# Patient Record
Sex: FEMALE | Race: WHITE | NOT HISPANIC OR LATINO | Employment: FULL TIME | URBAN - METROPOLITAN AREA
[De-identification: names, ages, dates, MRNs, and addresses within clinical notes are randomized per-mention and may not be internally consistent; named-entity substitution may affect disease eponyms.]

---

## 2017-02-03 ENCOUNTER — APPOINTMENT (OUTPATIENT)
Dept: LAB | Facility: CLINIC | Age: 22
End: 2017-02-03
Payer: COMMERCIAL

## 2017-02-03 ENCOUNTER — TRANSCRIBE ORDERS (OUTPATIENT)
Dept: LAB | Facility: CLINIC | Age: 22
End: 2017-02-03

## 2017-02-03 DIAGNOSIS — Z79.899 ENCOUNTER FOR LONG-TERM (CURRENT) USE OF OTHER MEDICATIONS: ICD-10-CM

## 2017-02-03 DIAGNOSIS — Z79.899 ENCOUNTER FOR LONG-TERM (CURRENT) USE OF OTHER MEDICATIONS: Primary | ICD-10-CM

## 2017-02-03 LAB
ALBUMIN SERPL BCP-MCNC: 4.3 G/DL (ref 3.5–5)
ALP SERPL-CCNC: 62 U/L (ref 46–116)
ALT SERPL W P-5'-P-CCNC: 29 U/L (ref 12–78)
AST SERPL W P-5'-P-CCNC: 14 U/L (ref 5–45)
B-HCG SERPL-ACNC: <2 MIU/ML
BASOPHILS # BLD AUTO: 0.02 THOUSANDS/ΜL (ref 0–0.1)
BASOPHILS NFR BLD AUTO: 0 % (ref 0–1)
BILIRUB DIRECT SERPL-MCNC: 0.14 MG/DL (ref 0–0.2)
BILIRUB SERPL-MCNC: 0.43 MG/DL (ref 0.2–1)
CHOLEST SERPL-MCNC: 168 MG/DL (ref 50–200)
EOSINOPHIL # BLD AUTO: 0 THOUSAND/ΜL (ref 0–0.61)
EOSINOPHIL NFR BLD AUTO: 0 % (ref 0–6)
ERYTHROCYTE [DISTWIDTH] IN BLOOD BY AUTOMATED COUNT: 13.1 % (ref 11.6–15.1)
HCT VFR BLD AUTO: 40.6 % (ref 34.8–46.1)
HDLC SERPL-MCNC: 70 MG/DL (ref 40–60)
HGB BLD-MCNC: 13.8 G/DL (ref 11.5–15.4)
LDLC SERPL CALC-MCNC: 87 MG/DL (ref 0–100)
LYMPHOCYTES # BLD AUTO: 1.65 THOUSANDS/ΜL (ref 0.6–4.47)
LYMPHOCYTES NFR BLD AUTO: 31 % (ref 14–44)
MCH RBC QN AUTO: 29.2 PG (ref 26.8–34.3)
MCHC RBC AUTO-ENTMCNC: 34 G/DL (ref 31.4–37.4)
MCV RBC AUTO: 86 FL (ref 82–98)
MONOCYTES # BLD AUTO: 0.44 THOUSAND/ΜL (ref 0.17–1.22)
MONOCYTES NFR BLD AUTO: 8 % (ref 4–12)
NEUTROPHILS # BLD AUTO: 3.21 THOUSANDS/ΜL (ref 1.85–7.62)
NEUTS SEG NFR BLD AUTO: 61 % (ref 43–75)
NRBC BLD AUTO-RTO: 0 /100 WBCS
PLATELET # BLD AUTO: 273 THOUSANDS/UL (ref 149–390)
PMV BLD AUTO: 10.4 FL (ref 8.9–12.7)
PROT SERPL-MCNC: 8 G/DL (ref 6.4–8.2)
RBC # BLD AUTO: 4.73 MILLION/UL (ref 3.81–5.12)
TRIGL SERPL-MCNC: 56 MG/DL
WBC # BLD AUTO: 5.32 THOUSAND/UL (ref 4.31–10.16)

## 2017-02-03 PROCEDURE — 80061 LIPID PANEL: CPT

## 2017-02-03 PROCEDURE — 80076 HEPATIC FUNCTION PANEL: CPT

## 2017-02-03 PROCEDURE — 84702 CHORIONIC GONADOTROPIN TEST: CPT

## 2017-02-03 PROCEDURE — 85025 COMPLETE CBC W/AUTO DIFF WBC: CPT

## 2017-02-03 PROCEDURE — 36415 COLL VENOUS BLD VENIPUNCTURE: CPT

## 2017-04-12 ENCOUNTER — APPOINTMENT (OUTPATIENT)
Dept: LAB | Facility: CLINIC | Age: 22
End: 2017-04-12
Payer: COMMERCIAL

## 2017-04-12 ENCOUNTER — TRANSCRIBE ORDERS (OUTPATIENT)
Dept: LAB | Facility: CLINIC | Age: 22
End: 2017-04-12

## 2017-04-12 DIAGNOSIS — Z79.899 ENCOUNTER FOR LONG-TERM (CURRENT) USE OF OTHER MEDICATIONS: Primary | ICD-10-CM

## 2017-04-12 DIAGNOSIS — Z79.899 ENCOUNTER FOR LONG-TERM (CURRENT) USE OF OTHER MEDICATIONS: ICD-10-CM

## 2017-04-12 LAB
ALBUMIN SERPL BCP-MCNC: 3.8 G/DL (ref 3.5–5)
ALP SERPL-CCNC: 65 U/L (ref 46–116)
ALT SERPL W P-5'-P-CCNC: 21 U/L (ref 12–78)
AST SERPL W P-5'-P-CCNC: 14 U/L (ref 5–45)
B-HCG SERPL-ACNC: <2 MIU/ML
BASOPHILS # BLD AUTO: 0.02 THOUSANDS/ΜL (ref 0–0.1)
BASOPHILS NFR BLD AUTO: 0 % (ref 0–1)
BILIRUB DIRECT SERPL-MCNC: 0.09 MG/DL (ref 0–0.2)
BILIRUB SERPL-MCNC: 0.3 MG/DL (ref 0.2–1)
CHOLEST SERPL-MCNC: 168 MG/DL (ref 50–200)
EOSINOPHIL # BLD AUTO: 0.05 THOUSAND/ΜL (ref 0–0.61)
EOSINOPHIL NFR BLD AUTO: 1 % (ref 0–6)
ERYTHROCYTE [DISTWIDTH] IN BLOOD BY AUTOMATED COUNT: 12.6 % (ref 11.6–15.1)
HCT VFR BLD AUTO: 41.3 % (ref 34.8–46.1)
HDLC SERPL-MCNC: 48 MG/DL (ref 40–60)
HGB BLD-MCNC: 13.6 G/DL (ref 11.5–15.4)
LDLC SERPL CALC-MCNC: 95 MG/DL (ref 0–100)
LYMPHOCYTES # BLD AUTO: 1.8 THOUSANDS/ΜL (ref 0.6–4.47)
LYMPHOCYTES NFR BLD AUTO: 31 % (ref 14–44)
MCH RBC QN AUTO: 28.3 PG (ref 26.8–34.3)
MCHC RBC AUTO-ENTMCNC: 32.9 G/DL (ref 31.4–37.4)
MCV RBC AUTO: 86 FL (ref 82–98)
MONOCYTES # BLD AUTO: 0.33 THOUSAND/ΜL (ref 0.17–1.22)
MONOCYTES NFR BLD AUTO: 6 % (ref 4–12)
NEUTROPHILS # BLD AUTO: 3.63 THOUSANDS/ΜL (ref 1.85–7.62)
NEUTS SEG NFR BLD AUTO: 62 % (ref 43–75)
NRBC BLD AUTO-RTO: 0 /100 WBCS
PLATELET # BLD AUTO: 318 THOUSANDS/UL (ref 149–390)
PMV BLD AUTO: 10.6 FL (ref 8.9–12.7)
PROT SERPL-MCNC: 7.6 G/DL (ref 6.4–8.2)
RBC # BLD AUTO: 4.8 MILLION/UL (ref 3.81–5.12)
TRIGL SERPL-MCNC: 124 MG/DL
WBC # BLD AUTO: 5.84 THOUSAND/UL (ref 4.31–10.16)

## 2017-04-12 PROCEDURE — 84702 CHORIONIC GONADOTROPIN TEST: CPT

## 2017-04-12 PROCEDURE — 36415 COLL VENOUS BLD VENIPUNCTURE: CPT

## 2017-04-12 PROCEDURE — 80061 LIPID PANEL: CPT

## 2017-04-12 PROCEDURE — 80076 HEPATIC FUNCTION PANEL: CPT

## 2017-04-12 PROCEDURE — 85025 COMPLETE CBC W/AUTO DIFF WBC: CPT

## 2019-02-25 ENCOUNTER — APPOINTMENT (OUTPATIENT)
Dept: RADIOLOGY | Age: 24
End: 2019-02-25
Payer: COMMERCIAL

## 2019-02-25 ENCOUNTER — TRANSCRIBE ORDERS (OUTPATIENT)
Dept: ADMINISTRATIVE | Age: 24
End: 2019-02-25

## 2019-02-25 DIAGNOSIS — M54.5 LOW BACK PAIN, UNSPECIFIED BACK PAIN LATERALITY, UNSPECIFIED CHRONICITY, WITH SCIATICA PRESENCE UNSPECIFIED: Primary | ICD-10-CM

## 2019-02-25 DIAGNOSIS — M54.5 LOW BACK PAIN, UNSPECIFIED BACK PAIN LATERALITY, UNSPECIFIED CHRONICITY, WITH SCIATICA PRESENCE UNSPECIFIED: ICD-10-CM

## 2019-02-25 PROCEDURE — 72100 X-RAY EXAM L-S SPINE 2/3 VWS: CPT

## 2022-04-21 NOTE — PROGRESS NOTES
Assessment/Plan:     The patient likes current contraceptive method and desires to continue  She was given written  information to check insurance for coverage  Detailed preprocedure instructions given  Patient declines STI testing  Recommended monthly SBE and annual CBE  ASCCP guidelines reviewed and pap collected today  Return to the office for [facility] removal and reinsertion  Diagnoses and all orders for this visit:    Encounter for gynecological examination (general) (routine) without abnormal findings    Encounter for gynecological examination with Papanicolaou smear of cervix  -     Liquid-based pap, screening    Other orders  -     levonorgestrel ([facility]) 19 5 MG intrauterine device; 1 each by Intrauterine route once        Subjective:      Patient ID: Constance Trammell is a 31 y o  female  This patient presents for routine annual gyn exam    She reports [facility] insertion 8/2017  Menses are absent, but she's been having more bloating, breast tenderness on left  She denies abn discharge, pelvic pain, bowel/bladder dysfunction, depression/anxiety  Monogamous relationship, 3 years, newly   Denies STI concerns  Likes current contraception and desires to continue  Would like a new kyleena inserted  The following portions of the patient's history were reviewed and updated as appropriate: allergies, current medications, past family history, past medical history, past social history, past surgical history and problem list     Review of Systems   Constitutional: Negative  HENT: Negative  Respiratory: Negative  Cardiovascular: Negative  Gastrointestinal: Negative  Endocrine: Negative  Genitourinary: Negative for difficulty urinating, dyspareunia, dysuria, frequency, menstrual problem, pelvic pain, urgency, vaginal bleeding, vaginal discharge and vaginal pain  Musculoskeletal: Negative  Skin: Negative  Neurological: Negative  Psychiatric/Behavioral: Negative  Objective:      /64 (BP Location: Left arm)   Pulse 76   Ht 5' 5 8" (1 671 m)   Wt 63 2 kg (139 lb 6 4 oz)   BMI 22 64 kg/m²          Physical Exam  Vitals and nursing note reviewed  Exam conducted with a chaperone present  Constitutional:       Appearance: Normal appearance  She is well-developed  HENT:      Head: Normocephalic and atraumatic  Neck:      Thyroid: No thyroid mass or thyromegaly  Cardiovascular:      Rate and Rhythm: Normal rate and regular rhythm  Heart sounds: Normal heart sounds  Pulmonary:      Effort: Pulmonary effort is normal       Breath sounds: Normal breath sounds  Chest:   Breasts: Breasts are symmetrical       Right: No inverted nipple, mass, nipple discharge, skin change or tenderness  Left: No inverted nipple, mass, nipple discharge, skin change or tenderness  Abdominal:      General: Bowel sounds are normal       Palpations: Abdomen is soft  Tenderness: There is no abdominal tenderness  Hernia: There is no hernia in the left inguinal area or right inguinal area  Genitourinary:     General: Normal vulva  Exam position: Supine  Pubic Area: No rash  Labia:         Right: No rash, tenderness, lesion or injury  Left: No rash, tenderness, lesion or injury  Urethra: No prolapse, urethral pain, urethral swelling or urethral lesion  Vagina: Normal  No signs of injury and foreign body  No vaginal discharge, erythema, tenderness, bleeding, lesions or prolapsed vaginal walls  Cervix: No cervical motion tenderness, discharge, friability, lesion, erythema, cervical bleeding or eversion  Uterus: Not deviated, not enlarged, not fixed, not tender and no uterine prolapse  Adnexa:         Right: No mass, tenderness or fullness  Left: No mass, tenderness or fullness  Rectum: No external hemorrhoid        Comments: Urethra normal without lesions  No bladder tenderness  IUD strings noted extending 2 cms from os  Musculoskeletal:         General: Normal range of motion  Cervical back: Normal range of motion and neck supple  Lymphadenopathy:      Lower Body: No right inguinal adenopathy  No left inguinal adenopathy  Skin:     General: Skin is warm and dry  Neurological:      Mental Status: She is alert and oriented to person, place, and time  Psychiatric:         Speech: Speech normal          Behavior: Behavior normal  Behavior is cooperative

## 2022-04-22 ENCOUNTER — OFFICE VISIT (OUTPATIENT)
Dept: GYNECOLOGY | Facility: CLINIC | Age: 27
End: 2022-04-22
Payer: COMMERCIAL

## 2022-04-22 VITALS
BODY MASS INDEX: 22.4 KG/M2 | DIASTOLIC BLOOD PRESSURE: 64 MMHG | HEIGHT: 66 IN | SYSTOLIC BLOOD PRESSURE: 108 MMHG | HEART RATE: 76 BPM | WEIGHT: 139.4 LBS

## 2022-04-22 DIAGNOSIS — Z01.419 ENCOUNTER FOR GYNECOLOGICAL EXAMINATION (GENERAL) (ROUTINE) WITHOUT ABNORMAL FINDINGS: Primary | ICD-10-CM

## 2022-04-22 DIAGNOSIS — Z01.419 ENCOUNTER FOR GYNECOLOGICAL EXAMINATION WITH PAPANICOLAOU SMEAR OF CERVIX: ICD-10-CM

## 2022-04-22 PROCEDURE — G0145 SCR C/V CYTO,THINLAYER,RESCR: HCPCS | Performed by: OBSTETRICS & GYNECOLOGY

## 2022-04-22 PROCEDURE — 99385 PREV VISIT NEW AGE 18-39: CPT | Performed by: OBSTETRICS & GYNECOLOGY

## 2022-04-22 RX ORDER — LEVONORGESTREL 19.5 MG/1
1 INTRAUTERINE DEVICE INTRAUTERINE ONCE
COMMUNITY

## 2022-04-28 ENCOUNTER — DOCUMENTATION (OUTPATIENT)
Dept: GYNECOLOGY | Facility: CLINIC | Age: 27
End: 2022-04-28

## 2022-04-28 LAB
LAB AP GYN PRIMARY INTERPRETATION: NORMAL
Lab: NORMAL

## 2022-06-06 ENCOUNTER — TELEPHONE (OUTPATIENT)
Dept: GYNECOLOGY | Facility: CLINIC | Age: 27
End: 2022-06-06

## 2022-06-06 DIAGNOSIS — Z11.3 SCREENING EXAMINATION FOR STD (SEXUALLY TRANSMITTED DISEASE): Primary | ICD-10-CM

## 2022-06-06 DIAGNOSIS — Z97.5 IUD CONTRACEPTION: ICD-10-CM

## 2022-06-06 NOTE — TELEPHONE ENCOUNTER
[facility] called and states IUD removal and insertion is covered  Pt  Needs  Audra ordered   Bloodwork for GC/CHL was entered Pt  To go to [address]  [facility] this week  Please call when IUD is here

## 2022-06-07 ENCOUNTER — DOCUMENTATION (OUTPATIENT)
Dept: GYNECOLOGY | Facility: CLINIC | Age: 27
End: 2022-06-07

## 2022-06-15 ENCOUNTER — DOCUMENTATION (OUTPATIENT)
Dept: GYNECOLOGY | Facility: CLINIC | Age: 27
End: 2022-06-15

## 2022-06-15 DIAGNOSIS — Z97.5 IUD CONTRACEPTION: Primary | ICD-10-CM

## 2022-06-15 RX ORDER — MISOPROSTOL 200 UG/1
TABLET ORAL
Qty: 2 TABLET | Refills: 0 | Status: SHIPPED | OUTPATIENT
Start: 2022-06-15

## 2022-06-15 NOTE — PROGRESS NOTES
Called pt to set up IUD insertion appt she has IUD in now that will be removed then new Kle inserted  Gave her instructions for Advil,  Eating bringing a  and cytotec     appt is 7/12

## 2022-07-01 ENCOUNTER — APPOINTMENT (OUTPATIENT)
Dept: LAB | Facility: MEDICAL CENTER | Age: 27
End: 2022-07-01
Payer: COMMERCIAL

## 2022-07-01 PROCEDURE — 87591 N.GONORRHOEAE DNA AMP PROB: CPT

## 2022-07-01 PROCEDURE — 87491 CHLMYD TRACH DNA AMP PROBE: CPT

## 2022-07-04 LAB
C TRACH DNA SPEC QL NAA+PROBE: NEGATIVE
N GONORRHOEA DNA SPEC QL NAA+PROBE: NEGATIVE

## 2022-07-12 ENCOUNTER — PROCEDURE VISIT (OUTPATIENT)
Dept: GYNECOLOGY | Facility: CLINIC | Age: 27
End: 2022-07-12
Payer: COMMERCIAL

## 2022-07-12 DIAGNOSIS — Z30.432 ENCOUNTER FOR IUD REMOVAL: ICD-10-CM

## 2022-07-12 DIAGNOSIS — Z30.430 ENCOUNTER FOR IUD INSERTION: Primary | ICD-10-CM

## 2022-07-12 PROCEDURE — 58300 INSERT INTRAUTERINE DEVICE: CPT | Performed by: OBSTETRICS & GYNECOLOGY

## 2022-07-12 PROCEDURE — 58301 REMOVE INTRAUTERINE DEVICE: CPT | Performed by: OBSTETRICS & GYNECOLOGY

## 2022-07-12 NOTE — PROGRESS NOTES
Iud removal    Date/Time: 7/12/2022 11:10 AM  Performed by: JOSUÉ Cain  Authorized by: JOSUÉ Cain   Universal Protocol:  Consent: Verbal consent obtained  Written consent obtained  Risks and benefits: risks, benefits and alternatives were discussed (pain, infection, bleeding, injury to surrounding structures, failure to remove, IUD breakage)  Consent given by: patient  Time out: Immediately prior to procedure a "time out" was called to verify the correct patient, procedure, equipment, support staff and site/side marked as required  Timeout called at: 7/12/2022 11:10 AM   Patient understanding: patient states understanding of the procedure being performed  Patient consent: the patient's understanding of the procedure matches consent given  Procedure consent: procedure consent matches procedure scheduled  Relevant documents: relevant documents present and verified  Test results: test results available and properly labeled  Site marked: the operative site was marked  Required items: required blood products, implants, devices, and special equipment available  Patient identity confirmed: verbally with patient      Procedure:     Removed with no complications: yes      Other reason for removal:  Expiration  Iud insertions    Date/Time: 7/12/2022 11:15 AM  Performed by: JOSUÉ Cain  Authorized by: JOSUÉ Cain   Universal Protocol:  Consent: Verbal consent obtained  Written consent obtained  Risks and benefits: risks, benefits and alternatives were discussed (infection, bleeding, pain, injury to surrounding tissue, failure to insert, uterine perforation, IUD migration, expulsion)  Consent given by: patient  Time out: Immediately prior to procedure a "time out" was called to verify the correct patient, procedure, equipment, support staff and site/side marked as required    Timeout called at: 7/12/2022 11:15 AM   Patient understanding: patient states understanding of the procedure being performed  Patient consent: the patient's understanding of the procedure matches consent given  Procedure consent: procedure consent matches procedure scheduled  Relevant documents: relevant documents present and verified  Test results: test results available and properly labeled  Site marked: the operative site was marked  Radiology Images displayed and confirmed  If images not available, report reviewed: imaging studies available  Required items: required blood products, implants, devices, and special equipment available  Patient identity confirmed: verbally with patient        Procedure:     Pelvic exam performed: yes      Cervix cleaned and prepped: yes      Speculum placed in vagina: yes      Tenaculum applied to cervix: yes      Uterus sounded: yes      Uterus sound depth (cm):  7    IUD inserted with no complications: yes      IUD type: Malou Bound  Strings trimmed: yes (3 cms)    Post-procedure:     Patient tolerated procedure well: yes    Comments:      Lot # [id number]  Exp 3/2024    Patient tolerated procedure well  Pain level on insertion 6/10, upon discharge 4/10  Post procedure instructions given  Will RTO in 1 month for recheck

## 2022-08-09 ENCOUNTER — OFFICE VISIT (OUTPATIENT)
Dept: GYNECOLOGY | Facility: CLINIC | Age: 27
End: 2022-08-09
Payer: COMMERCIAL

## 2022-08-09 VITALS
HEART RATE: 73 BPM | DIASTOLIC BLOOD PRESSURE: 64 MMHG | BODY MASS INDEX: 21.86 KG/M2 | HEIGHT: 66 IN | WEIGHT: 136 LBS | SYSTOLIC BLOOD PRESSURE: 110 MMHG

## 2022-08-09 DIAGNOSIS — Z30.431 ENCOUNTER FOR ROUTINE CHECKING OF INTRAUTERINE CONTRACEPTIVE DEVICE (IUD): Primary | ICD-10-CM

## 2022-08-09 PROCEDURE — 99202 OFFICE O/P NEW SF 15 MIN: CPT | Performed by: OBSTETRICS & GYNECOLOGY

## 2022-08-09 NOTE — PROGRESS NOTES
Assessment/Plan:    Patient likes the Malou Bound and would like to continue  Will RTO in 1 year or as needed  Diagnoses and all orders for this visit:    Encounter for routine checking of intrauterine contraceptive device (IUD)        Subjective:      Patient ID: Koko Freedman is a 31 y o  female  Patient presents for IUD check  She had Malou Bound placed 7/12/22  She did well after with cramping for about 2 hours  She has not had any bleeding since insertion and would like to continue  She denies any other gyn concerns  The following portions of the patient's history were reviewed and updated as appropriate: allergies, current medications, past family history, past medical history, past social history, past surgical history and problem list     Review of Systems   Constitutional: Negative  HENT: Negative  Respiratory: Negative  Cardiovascular: Negative  Gastrointestinal: Negative  Endocrine: Negative  Genitourinary: Negative for difficulty urinating, dyspareunia, dysuria, frequency, menstrual problem, pelvic pain, urgency, vaginal bleeding, vaginal discharge and vaginal pain  Musculoskeletal: Negative  Skin: Negative  Neurological: Negative  Psychiatric/Behavioral: Negative  Objective:      /64   Pulse 73   Ht 5' 5 8" (1 671 m)   Wt 61 7 kg (136 lb)   BMI 22 08 kg/m²          Physical Exam  Vitals and nursing note reviewed  Exam conducted with a chaperone present  Constitutional:       Appearance: Normal appearance  HENT:      Head: Normocephalic and atraumatic  Pulmonary:      Effort: Pulmonary effort is normal    Abdominal:      Hernia: There is no hernia in the left inguinal area or right inguinal area  Genitourinary:     General: Normal vulva  Exam position: Supine  Pubic Area: No rash  Labia:         Right: No rash, tenderness, lesion or injury  Left: No rash, tenderness, lesion or injury         Urethra: No urethral pain, urethral swelling or urethral lesion  Vagina: No signs of injury and foreign body  No vaginal discharge, erythema, tenderness, bleeding or lesions  Cervix: No cervical motion tenderness, discharge, friability, lesion, erythema, cervical bleeding or eversion  Uterus: Not deviated, not enlarged, not fixed and not tender  Adnexa:         Right: No mass, tenderness or fullness  Left: No mass, tenderness or fullness  Comments: IUD strings noted 2 cms from os  Musculoskeletal:         General: Normal range of motion  Cervical back: Normal range of motion  Lymphadenopathy:      Lower Body: No right inguinal adenopathy  No left inguinal adenopathy  Skin:     General: Skin is warm and dry  Neurological:      Mental Status: She is alert and oriented to person, place, and time  Psychiatric:         Mood and Affect: Mood normal          Behavior: Behavior normal          Thought Content:  Thought content normal          Judgment: Judgment normal

## 2023-08-29 NOTE — PROGRESS NOTES
Assessment/Plan:    Normal findings on routine gyn exam. The patient likes current contraceptive method and desires to continue. Recommended monthly SBE and annual CBE. ASCCP guidelines reviewed and no pap collected today. The patient declines STI testing at this time. She is aware that condoms are recommended with all sexual contact for STI prevention. Reviewed diet/activity recommendations. Return to the office in one year for routine annual gyn exam or sooner PRN. Diagnoses and all orders for this visit:    Encounter for gynecological examination (general) (routine) without abnormal findings    Other orders  -     amoxicillin-clavulanate (AUGMENTIN) 500-125 mg per tablet        Subjective:      Patient ID: Trey Lane is a 28 y.o. female. This patient presents for routine annual gyn exam.   Job Robertson inserted 7/12/22. Menses are absent. She denies breast concerns, abn discharge, pelvic pain, bowel/bladder dysfunction, depression/anxiety. Monogamous relationship, . Denies STI concerns. Likes current contraception and desires to continue. Last pap 4/22/22. The following portions of the patient's history were reviewed and updated as appropriate: allergies, current medications, past family history, past medical history, past social history, past surgical history and problem list.    Review of Systems   Constitutional: Negative. HENT: Negative. Respiratory: Negative. Cardiovascular: Negative. Gastrointestinal: Negative. Endocrine: Negative. Genitourinary: Negative for difficulty urinating, dyspareunia, dysuria, frequency, menstrual problem, pelvic pain, urgency, vaginal bleeding, vaginal discharge and vaginal pain. Musculoskeletal: Negative. Skin: Negative. Neurological: Negative. Psychiatric/Behavioral: Negative.           Objective:      /60   Ht 5' 5.8" (1.671 m)   Wt 62.6 kg (138 lb)   BMI 22.41 kg/m²          Physical Exam  Vitals and nursing note reviewed. Exam conducted with a chaperone present. Constitutional:       Appearance: Normal appearance. She is well-developed. HENT:      Head: Normocephalic and atraumatic. Neck:      Thyroid: No thyroid mass or thyromegaly. Cardiovascular:      Rate and Rhythm: Normal rate and regular rhythm. Heart sounds: Normal heart sounds. Pulmonary:      Effort: Pulmonary effort is normal.      Breath sounds: Normal breath sounds. Chest:   Breasts:     Breasts are symmetrical.      Right: No inverted nipple, mass, nipple discharge, skin change or tenderness. Left: No inverted nipple, mass, nipple discharge, skin change or tenderness. Abdominal:      General: Bowel sounds are normal.      Palpations: Abdomen is soft. Tenderness: There is no abdominal tenderness. Hernia: There is no hernia in the left inguinal area or right inguinal area. Genitourinary:     General: Normal vulva. Exam position: Supine. Pubic Area: No rash. Labia:         Right: No rash, tenderness, lesion or injury. Left: No rash, tenderness, lesion or injury. Urethra: No prolapse, urethral pain, urethral swelling or urethral lesion. Vagina: Normal. No signs of injury and foreign body. No vaginal discharge, erythema, tenderness, bleeding, lesions or prolapsed vaginal walls. Cervix: No cervical motion tenderness, discharge, friability, lesion, erythema, cervical bleeding or eversion. Uterus: Not deviated, not enlarged, not fixed, not tender and no uterine prolapse. Adnexa:         Right: No mass, tenderness or fullness. Left: No mass, tenderness or fullness. Rectum: No external hemorrhoid. Comments: Urethra normal without lesions  No bladder tenderness  IUD strings noted 2 cms from os  Musculoskeletal:         General: Normal range of motion. Cervical back: Normal range of motion and neck supple.    Lymphadenopathy:      Lower Body: No right inguinal adenopathy. No left inguinal adenopathy. Skin:     General: Skin is warm and dry. Neurological:      Mental Status: She is alert and oriented to person, place, and time. Psychiatric:         Speech: Speech normal.         Behavior: Behavior normal. Behavior is cooperative.

## 2023-09-01 ENCOUNTER — OFFICE VISIT (OUTPATIENT)
Dept: GYNECOLOGY | Facility: CLINIC | Age: 28
End: 2023-09-01
Payer: COMMERCIAL

## 2023-09-01 VITALS
BODY MASS INDEX: 22.18 KG/M2 | SYSTOLIC BLOOD PRESSURE: 112 MMHG | DIASTOLIC BLOOD PRESSURE: 60 MMHG | HEIGHT: 66 IN | WEIGHT: 138 LBS

## 2023-09-01 DIAGNOSIS — Z01.419 ENCOUNTER FOR GYNECOLOGICAL EXAMINATION (GENERAL) (ROUTINE) WITHOUT ABNORMAL FINDINGS: Primary | ICD-10-CM

## 2023-09-01 PROCEDURE — 99395 PREV VISIT EST AGE 18-39: CPT | Performed by: OBSTETRICS & GYNECOLOGY

## 2023-09-01 RX ORDER — AMOXICILLIN AND CLAVULANATE POTASSIUM 500; 125 MG/1; MG/1
TABLET, FILM COATED ORAL
COMMUNITY
Start: 2023-08-29

## 2024-09-04 NOTE — PROGRESS NOTES
"Assessment/Plan:    Normal findings on routine gyn exam. The patient likes current contraceptive method and desires to continue, Kyleena. Recommended monthly SBE and annual CBE. ASCCP guidelines reviewed and no pap collected today.   Reviewed diet/activity recommendations. Return to the office in one year for routine annual gyn exam or sooner PRN.      Diagnoses and all orders for this visit:    Encounter for gynecological examination (general) (routine) without abnormal findings        Subjective:      Patient ID: Danitza Chao is a 28 y.o. female.    This patient presents for routine annual gyn exam.   Kyleena inserted 7/12/22.  Menses are absent.  She denies breast concerns, abn discharge, pelvic pain, bowel/bladder dysfunction, depression/anxiety.   Monogamous relationship, .  Likes current contraception and desires to continue.   Last pap 4/22/22.          The following portions of the patient's history were reviewed and updated as appropriate: allergies, current medications, past family history, past medical history, past social history, past surgical history and problem list.    Review of Systems   Constitutional: Negative.    HENT: Negative.     Respiratory: Negative.     Cardiovascular: Negative.    Gastrointestinal: Negative.    Endocrine: Negative.    Genitourinary:  Negative for difficulty urinating, dyspareunia, dysuria, frequency, menstrual problem, pelvic pain, urgency, vaginal bleeding, vaginal discharge and vaginal pain.   Musculoskeletal: Negative.    Skin: Negative.    Neurological: Negative.    Psychiatric/Behavioral: Negative.           Objective:      /60 (BP Location: Left arm, Patient Position: Standing, Cuff Size: Standard)   Ht 5' 5.8\" (1.671 m)   Wt 63.5 kg (140 lb)   BMI 22.73 kg/m²          Physical Exam  Vitals and nursing note reviewed. Exam conducted with a chaperone present.   Constitutional:       Appearance: Normal appearance. She is well-developed.   HENT:      " Head: Normocephalic and atraumatic.   Neck:      Thyroid: No thyroid mass or thyromegaly.   Cardiovascular:      Rate and Rhythm: Normal rate and regular rhythm.      Heart sounds: Normal heart sounds.   Pulmonary:      Effort: Pulmonary effort is normal.      Breath sounds: Normal breath sounds.   Chest:   Breasts:     Breasts are symmetrical.      Right: No inverted nipple, mass, nipple discharge, skin change or tenderness.      Left: No inverted nipple, mass, nipple discharge, skin change or tenderness.   Abdominal:      General: Bowel sounds are normal.      Palpations: Abdomen is soft.      Tenderness: There is no abdominal tenderness.      Hernia: There is no hernia in the left inguinal area or right inguinal area.   Genitourinary:     General: Normal vulva.      Exam position: Supine.      Pubic Area: No rash.       Labia:         Right: No rash, tenderness, lesion or injury.         Left: No rash, tenderness, lesion or injury.       Urethra: No prolapse, urethral pain, urethral swelling or urethral lesion.      Vagina: Normal. No signs of injury and foreign body. No vaginal discharge, erythema, tenderness, bleeding, lesions or prolapsed vaginal walls.      Cervix: No cervical motion tenderness, discharge, friability, lesion, erythema, cervical bleeding or eversion.      Uterus: Not deviated, not enlarged, not fixed, not tender and no uterine prolapse.       Adnexa:         Right: No mass, tenderness or fullness.          Left: No mass, tenderness or fullness.        Rectum: No external hemorrhoid.      Comments: Urethra normal without lesions  No bladder tenderness  IUD strings noted at 2 cms from os  Musculoskeletal:         General: Normal range of motion.      Cervical back: Normal range of motion and neck supple.   Lymphadenopathy:      Lower Body: No right inguinal adenopathy. No left inguinal adenopathy.   Skin:     General: Skin is warm and dry.   Neurological:      Mental Status: She is alert and  oriented to person, place, and time.   Psychiatric:         Speech: Speech normal.         Behavior: Behavior normal. Behavior is cooperative.

## 2024-09-06 ENCOUNTER — ANNUAL EXAM (OUTPATIENT)
Dept: GYNECOLOGY | Facility: CLINIC | Age: 29
End: 2024-09-06
Payer: COMMERCIAL

## 2024-09-06 VITALS
DIASTOLIC BLOOD PRESSURE: 60 MMHG | SYSTOLIC BLOOD PRESSURE: 100 MMHG | BODY MASS INDEX: 22.5 KG/M2 | HEIGHT: 66 IN | WEIGHT: 140 LBS

## 2024-09-06 DIAGNOSIS — Z01.419 ENCOUNTER FOR GYNECOLOGICAL EXAMINATION (GENERAL) (ROUTINE) WITHOUT ABNORMAL FINDINGS: Primary | ICD-10-CM

## 2024-09-06 PROCEDURE — 99395 PREV VISIT EST AGE 18-39: CPT | Performed by: OBSTETRICS & GYNECOLOGY
